# Patient Record
Sex: FEMALE | Race: WHITE
[De-identification: names, ages, dates, MRNs, and addresses within clinical notes are randomized per-mention and may not be internally consistent; named-entity substitution may affect disease eponyms.]

---

## 2020-07-23 ENCOUNTER — HOSPITAL ENCOUNTER (INPATIENT)
Dept: HOSPITAL 74 - JLDR | Age: 30
LOS: 3 days | Discharge: HOME | End: 2020-07-26
Attending: OBSTETRICS & GYNECOLOGY | Admitting: OBSTETRICS & GYNECOLOGY
Payer: COMMERCIAL

## 2020-07-23 VITALS — BODY MASS INDEX: 45.7 KG/M2

## 2020-07-23 DIAGNOSIS — Z3A.40: ICD-10-CM

## 2020-07-23 DIAGNOSIS — O48.0: ICD-10-CM

## 2020-07-23 LAB
ANION GAP SERPL CALC-SCNC: 10 MMOL/L (ref 8–16)
APTT BLD: 24.1 SECONDS (ref 25.2–36.5)
BASOPHILS # BLD: 0.7 % (ref 0–2)
BUN SERPL-MCNC: 8.6 MG/DL (ref 7–18)
CALCIUM SERPL-MCNC: 9.5 MG/DL (ref 8.5–10.1)
CHLORIDE SERPL-SCNC: 106 MMOL/L (ref 98–107)
CO2 SERPL-SCNC: 21 MMOL/L (ref 21–32)
CREAT SERPL-MCNC: 0.6 MG/DL (ref 0.55–1.3)
DEPRECATED RDW RBC AUTO: 14.9 % (ref 11.6–15.6)
EOSINOPHIL # BLD: 0.5 % (ref 0–4.5)
GLUCOSE SERPL-MCNC: 95 MG/DL (ref 74–106)
HCT VFR BLD CALC: 34.3 % (ref 32.4–45.2)
HGB BLD-MCNC: 11.1 GM/DL (ref 10.7–15.3)
INR BLD: 0.9 (ref 0.83–1.09)
LYMPHOCYTES # BLD: 15.6 % (ref 8–40)
MCH RBC QN AUTO: 28.5 PG (ref 25.7–33.7)
MCHC RBC AUTO-ENTMCNC: 32.3 G/DL (ref 32–36)
MCV RBC: 88.3 FL (ref 80–96)
MONOCYTES # BLD AUTO: 6.4 % (ref 3.8–10.2)
NEUTROPHILS # BLD: 76.8 % (ref 42.8–82.8)
PLATELET # BLD AUTO: 306 K/MM3 (ref 134–434)
PMV BLD: 9.7 FL (ref 7.5–11.1)
POTASSIUM SERPLBLD-SCNC: 4.4 MMOL/L (ref 3.5–5.1)
PT PNL PPP: 10.6 SEC (ref 9.7–13)
RBC # BLD AUTO: 3.89 M/MM3 (ref 3.6–5.2)
SODIUM SERPL-SCNC: 136 MMOL/L (ref 136–145)
WBC # BLD AUTO: 12.6 K/MM3 (ref 4–10)

## 2020-07-24 RX ADMIN — SIMETHICONE CHEW TAB 80 MG PRN MG: 80 TABLET ORAL at 18:32

## 2020-07-24 RX ADMIN — LABETALOL HYDROCHLORIDE SCH: 200 TABLET, FILM COATED ORAL at 10:00

## 2020-07-24 RX ADMIN — Medication SCH MLS/HR: at 19:48

## 2020-07-24 RX ADMIN — ACETAMINOPHEN PRN MG: 325 TABLET ORAL at 18:32

## 2020-07-24 RX ADMIN — SODIUM CHLORIDE, SODIUM GLUCONATE, SODIUM ACETATE, POTASSIUM CHLORIDE, AND MAGNESIUM CHLORIDE SCH MLS/HR: 526; 502; 368; 37; 30 INJECTION, SOLUTION INTRAVENOUS at 00:10

## 2020-07-24 RX ADMIN — LABETALOL HYDROCHLORIDE SCH MG: 200 TABLET, FILM COATED ORAL at 22:18

## 2020-07-24 RX ADMIN — Medication SCH MLS/HR: at 11:30

## 2020-07-24 NOTE — HP
Past Medical History





- Admission


Chief Complaint: in labor early


History Source: Patient


Limitations to Obtaining History: No Limitations





- Past Medical History


CNS: No: Alzheimer's, CVA, Dementia, Migraine, Multiple Sclerosis, Peripheral 

Neuropathy, Parkinson's, Seizure, Syncope, TIA, Vertigo, Other


Cardiovascular: No: AFIB, Aneurysm, Aortic Insufficiency, Aortic Stenosis, CAD, 

CHF, Deep Vein Thrombosis, HTN, Hyperlipdemia, MI, Mitral Insufficiency, Mitral 

Stenosis, Murmur, Pulmonary Hypertension, Other


Pulmonary: No: Asthma, Bronchitis, Cancer, COPD, O2 Dependent, Pneumonia, 

Previously Intubated, Pulmonary Embolus, Pulmonary Fibrosis, Sleep Apnea, Other


Gastrointestinal: No: Ascites, Cancer, Constipation, Crohn's Disease, 

Diverticulitis, Diverticulosis, Esophageal Varices, Gastritis, GERD, GI Bleed, 

Hemorrhoids, Hiatal Hernia, Inflamatory Bowel Disease, Irritable Bowel Disease, 

Pancreatitis, Peptic Ulcer Disease, Ulcerative Colitis, Other


Hepatobiliary: No: Cirrhosis, Cholelithiasis, Cholecystitis, Choledocholithia

sis, Hepatitis A, Hepatitis B, Hepatitis C, Other


Renal/: No: Renal Failure, Renal Inusuff, BPH, Cancer, Hematuria, 

Hemodialysis, Neurogenic Bladder, Renal Calculi, UTI, Other


Reproductive: No: Ectopic Pregnancy, Endometriosis, Fibroids, PID, Polycystic 

Ovary Syndrome, Postmenopausal, Other


...: 1


...Para: 0


...EDC by Emanuel: 20


Heme/Onc: No: Anemia, B12 Deficiency, Bleeding Disorder, Cancer, Current 

Chemotherapy, Current Radiation Therapy, Hemochromatosis, Hypercoaguable State, 

Myeloproliferative Synd, Sickle Cell Disease, Sickle Cell Trait, 

Thrombocytopenia, Other


Infectious Disease: No: AIDS, C-Diff, Herpes Zoster, HIV, MRSA, STD's, 

Tuberculosis, VREF, Other


Psych: No: Addictions, Anxiety, Bipolar, Depression, Panic, Psychosis, 

Schizophrenia, Other


Musculoskeletal: No: Bursitis, Chronic low back pain, Hemiparesis, Hemiplegia, 

Osteoarthritis, Paraplegia, Other


Rheumatology: No: Fibromyalgia, Gout, Lupus, Rheumatoid Arthritis, Sarcoidosis, 

Vasculitis, Other


ENT: No: Allergic Rhinitis, Sinusitis, Other


Endocrine: No: Ciales's Disease, Cushing's Disease, Diabetes Insipidus, 

Diabetes Mellitus, Hyperparathyroidism, Hyperthyroidism, Hypothyroidism, 

Osteopenia, SIADH, Other


Dermatology: No: Basal Cell, Cellulitis, Eczema, Melanoma, Psoriasis, Squamous 

Cell, Other





- Past Surgical History


Past Surgical History: No: None, AAA Repair, AICD, Amputation, Appendectomy, 

Arthrosocopy, AV Fistula/Graft, Bariatric Surgery, Breast Biopsy, Bypass, CABG, 

Carotid Endarterectomy, Cataract Removal, Cholecystectomy, Colectomy, 

Colonoscopy, Colostomy, Craniotomy, , Cystectomy, Hernia Repair, 

Hysterectomy, Ileal Conduit, Ileosotomy, Joint Replacement, Kidney Transplant, 

Laminectomy, Liver Transplant, Mastectomy, Nephrectomy, Oopherectomy, 

Orchiectomy, Permanent Pacemaker, Prostatectomy, Splenectomy, Stent, 

Thoracotomy, TURP, Tonsillectomy, Tubal Ligation, Upper Endoscopy, Valve 

Replacement, Vasectomy, Vein Stripping/Ligation


Hx Myomectomy: No


Hx Transabdominal Cerclage: No





- Advance Directives


Advance Directives: Yes: Living Will





- Smoking History


Smoking history: Never smoked


Have you smoked in the past 12 months: No





- Alcohol/Substance Use


Hx Alcohol Use: No


History of Substance Use: reports: None





- Social History


Usual Living Arrangement: Yes: With Significant Other


Do you think of yourself as: Straight/Heterosexual


ADL: Independent


History of Recent Travel: No





Home Medications





- Allergies


Allergies/Adverse Reactions: 


                                    Allergies











Allergy/AdvReac Type Severity Reaction Status Date / Time


 


No Known Allergies Allergy   Verified 20 00:00














- Home Medications


Home Medications: 


Ambulatory Orders





Labetalol HCl [Normodyne -] 200 mg PO DAILY 20 


Pnv No.95/Ferrous Fum/Folic AC [Prenatal Vitamin Tablet] 1 tab PO DAILY 20













Family Medical History


Family History: Denies





Review of Systems





- Review of Systems


Constitutional: reports: No Symptoms


Eyes: reports: No Symptoms


HENT: reports: No Symptoms


Neck: reports: No Symptoms


Cardiovascular: reports: No Symptoms


Respiratory: reports: No Symptoms


Gastrointestinal: reports: No Symptoms


Genitourinary: reports: No Symptoms


Breasts: reports: No Symptoms Reported


Musculoskeletal: reports: No Symptoms


Integumentary: reports: No Symptoms


Neurological: reports: No Symptoms


Endocrine: reports: No Symptoms


Hematology/Lymphatic: reports: No Symptoms


Psychiatric: reports: No Symptoms





Physical Exam - Maternity


Vital Signs: 


                                   Vital Signs











Temperature  99.3 F   20 06:00


 


Pulse Rate  97 H  20 06:00


 


Respiratory Rate  18   07/24/20 06:00


 


Blood Pressure  137/79   20 06:00


 


O2 Sat by Pulse Oximetry (%)      











Constitutional: Yes: Well Nourished, No Distress, Calm


Eyes: Yes: WNL, Conjunctiva Clear, EOM Intact


HENT: Yes: WNL, Atraumatic, Normocephalic


Neck: Yes: WNL, Supple, Trachea Midline


Cardiovascular: Yes: WNL, Regular Rate and Rhythm


Breast(s): Yes: WNL





- Abdominal Exam/OB


Number of Fetuses: Single


Fetal Presentation: Vertex


Contractions: Yes


Regularity: Regular


Intensity: Mod/Strong


Fetal Monitor Mode: External


Fetal Heart Rate (range): 150


Category: I


Accelerations: Uniform


Decelerations: None





- Vaginal Exam/OB


Vaginal Bleeding: No


Speculum Exam: No


Dilatation (cm): 2


Effacement (%): 80


Amniotic Membrane Status: Intact


Fetal Presentation: Vertex/Position


Fetal Station: -3





- Physical Exam


Musculoskeletal: Yes: WNL


Extremities: Yes: WNL


Edema: Yes


Edema: LUE: 2+, RUE: 2+, LLE: 2+, RLE: 2+


Integumentary: Yes: WNL


Deep Tendon Reflex Grade: Normal +2


...Motor Strength: WNL


Psychiatric: Yes: WNL, Alert, Oriented





- Labs


Lab Results: 


                                    CBC, BMP





                                 20 23:15 





                                 20 23:15 











Hemorrhage Risk Assessment





- Risk Factors


Medium Risk Factors: Yes: None


High Risk Factors: Yes: None


Risk Score: 1


Risk Level: Medium Risk





Assessment/Plan





for laboring, pt had cat 1 and cat 2 nst, on and off, cervix is unfavarable, 

labile bp on labetolo, for c s

## 2020-07-24 NOTE — OP
Operative Note





- Note:


Operative Date: 07/24/20


Pre-Operative Diagnosis: non reasssuring fht, f t progress


Operation: primary lt c s


Post-Operative Diagnosis: Same as Pre-op


Surgeon: Ender Carrion


Assistant: David Arnett


Anesthesia: Spinal


Estimated Blood Loss (mls): 500 (no complications )


Operative Report Dictated: Yes

## 2020-07-25 LAB
BASOPHILS # BLD: 0.3 % (ref 0–2)
DEPRECATED RDW RBC AUTO: 15.1 % (ref 11.6–15.6)
EOSINOPHIL # BLD: 0.1 % (ref 0–4.5)
HCT VFR BLD CALC: 30.9 % (ref 32.4–45.2)
HGB BLD-MCNC: 9.8 GM/DL (ref 10.7–15.3)
LYMPHOCYTES # BLD: 16 % (ref 8–40)
MCH RBC QN AUTO: 28.1 PG (ref 25.7–33.7)
MCHC RBC AUTO-ENTMCNC: 31.6 G/DL (ref 32–36)
MCV RBC: 88.8 FL (ref 80–96)
MONOCYTES # BLD AUTO: 8.5 % (ref 3.8–10.2)
NEUTROPHILS # BLD: 75.1 % (ref 42.8–82.8)
PLATELET # BLD AUTO: 305 K/MM3 (ref 134–434)
PMV BLD: 9 FL (ref 7.5–11.1)
RBC # BLD AUTO: 3.48 M/MM3 (ref 3.6–5.2)
WBC # BLD AUTO: 13.4 K/MM3 (ref 4–10)

## 2020-07-25 RX ADMIN — SIMETHICONE CHEW TAB 80 MG PRN MG: 80 TABLET ORAL at 01:46

## 2020-07-25 RX ADMIN — ACETAMINOPHEN PRN MG: 325 TABLET ORAL at 10:10

## 2020-07-25 RX ADMIN — ACETAMINOPHEN PRN MG: 325 TABLET ORAL at 01:46

## 2020-07-25 RX ADMIN — IBUPROFEN PRN MG: 600 TABLET, FILM COATED ORAL at 10:11

## 2020-07-25 RX ADMIN — ACETAMINOPHEN PRN MG: 325 TABLET ORAL at 17:56

## 2020-07-25 RX ADMIN — Medication SCH TAB: at 10:11

## 2020-07-25 RX ADMIN — ACETAMINOPHEN PRN MG: 325 TABLET ORAL at 06:37

## 2020-07-25 RX ADMIN — ENOXAPARIN SODIUM SCH MG: 40 INJECTION SUBCUTANEOUS at 10:11

## 2020-07-25 RX ADMIN — IBUPROFEN PRN MG: 600 TABLET, FILM COATED ORAL at 17:56

## 2020-07-25 RX ADMIN — LABETALOL HYDROCHLORIDE SCH MG: 200 TABLET, FILM COATED ORAL at 10:11

## 2020-07-25 RX ADMIN — SIMETHICONE CHEW TAB 80 MG PRN MG: 80 TABLET ORAL at 06:37

## 2020-07-25 RX ADMIN — SODIUM CHLORIDE, SODIUM GLUCONATE, SODIUM ACETATE, POTASSIUM CHLORIDE, AND MAGNESIUM CHLORIDE SCH: 526; 502; 368; 37; 30 INJECTION, SOLUTION INTRAVENOUS at 01:45

## 2020-07-25 RX ADMIN — LABETALOL HYDROCHLORIDE SCH MG: 200 TABLET, FILM COATED ORAL at 22:28

## 2020-07-25 NOTE — PN
Post Partum Progress Note


Post Partum Day: 1


Type of Delivery: Primary C/S


Vital Signs: 


                                   Vital Signs











Temperature  98.4 F   07/25/20 09:00


 


Pulse Rate  109 H  07/25/20 18:00


 


Respiratory Rate  18   07/25/20 18:00


 


Blood Pressure  126/67   07/25/20 18:00


 


O2 Sat by Pulse Oximetry (%)  97   07/24/20 22:00











Breast Exam: Yes: Soft


Uterus: Yes: Fundus Firm


Incision: Yes: Dressing dry and intact, Sutures intact


Abdomen/GI: Yes: Abdomen soft, Passing flatus, Tolerating PO


Lochia: Yes: Serosa


Lochia, amount: Small


Extremities: Yes: Calves non-tender


Activity: Ambulating





- Labs


Labs: 


                                       CBC











WBC  13.4 K/mm3 (4.0-10.0)  H  07/25/20  07:15    


 


RBC  3.48 M/mm3 (3.60-5.2)  L  07/25/20  07:15    


 


Hgb  9.8 GM/dL (10.7-15.3)  L  07/25/20  07:15    


 


Hct  30.9 % (32.4-45.2)  L  07/25/20  07:15    


 


MCV  88.8 fl (80-96)   07/25/20  07:15    


 


MCH  28.1 pg (25.7-33.7)   07/25/20  07:15    


 


MCHC  31.6 g/dl (32.0-36.0)  L  07/25/20  07:15    


 


RDW  15.1 % (11.6-15.6)   07/25/20  07:15    


 


Plt Count  305 K/MM3 (134-434)   07/25/20  07:15    


 


MPV  9.0 fl (7.5-11.1)   07/25/20  07:15    


 


Absolute Neuts (auto)  10.1 K/mm3 (1.5-8.0)  H  07/25/20  07:15    


 


Neutrophils %  75.1 % (42.8-82.8)   07/25/20  07:15    


 


Lymphocytes %  16.0 % (8-40)   07/25/20  07:15    


 


Monocytes %  8.5 % (3.8-10.2)   07/25/20  07:15    


 


Eosinophils %  0.1 % (0-4.5)   07/25/20  07:15    


 


Basophils %  0.3 % (0-2.0)   07/25/20  07:15    


 


Nucleated RBC %  0 % (0-0)   07/25/20  07:15    














Assessment/Plan





dc ivf, oob , regular diet

## 2020-07-26 VITALS — HEART RATE: 97 BPM | SYSTOLIC BLOOD PRESSURE: 120 MMHG | TEMPERATURE: 97.6 F | DIASTOLIC BLOOD PRESSURE: 67 MMHG

## 2020-07-26 RX ADMIN — ENOXAPARIN SODIUM SCH MG: 40 INJECTION SUBCUTANEOUS at 10:07

## 2020-07-26 RX ADMIN — LABETALOL HYDROCHLORIDE SCH MG: 200 TABLET, FILM COATED ORAL at 10:07

## 2020-07-26 RX ADMIN — IBUPROFEN PRN MG: 600 TABLET, FILM COATED ORAL at 03:26

## 2020-07-26 RX ADMIN — ACETAMINOPHEN PRN MG: 325 TABLET ORAL at 03:27

## 2020-07-26 RX ADMIN — SIMETHICONE CHEW TAB 80 MG PRN MG: 80 TABLET ORAL at 03:27

## 2020-07-26 RX ADMIN — Medication SCH TAB: at 10:07

## 2020-07-26 NOTE — DS
Physical Exam-GYN


Vital Signs: 


                                   Vital Signs











Temperature  97.6 F   20 09:00


 


Pulse Rate  97 H  20 09:00


 


Respiratory Rate  20   20 09:00


 


Blood Pressure  120/67   20 09:00


 


O2 Sat by Pulse Oximetry (%)  97   20 22:00











Constitutional: Yes: Well Nourished, No Distress, Calm


Eyes: Yes: WNL, Conjunctiva Clear, EOM Intact


HENT: Yes: WNL, Atraumatic, Normocephalic


Neck: Yes: WNL, Supple, Trachea Midline


Cardiovascular: Yes: WNL, Regular Rate and Rhythm


Respiratory: Yes: WNL, Regular, CTA Bilaterally


Gastrointestinal: Yes: WNL, Normal Bowel Sounds, Soft


...Rectal Exam: Yes: WNL


Renal/: Yes: WNL


Pelvis: Yes: WNL


External Genitalia: Yes: Normal


Internal Exam Deferred: No


Vaginal Exam: Yes: Normal


Cervix: Yes: Normal


Uterus: Yes: Normal


....Post Partum: Yes: Uterus firm, Uterus non-tender


Breast(s): Yes: WNL


Musculoskeletal: Yes: WNL


Extremities: Yes: WNL


Edema: Yes


Integumentary: Yes: WNL


Wound/Incision: Yes: Clean/Dry, Well Approximated


Neurological: Yes: WNL, Alert, Oriented


...Motor Strength: WNL


Psychiatric: Yes: WNL, Alert, Oriented


Labs: 


                                    CBC, BMP





                                 20 07:15 





                                 20 23:15 











Delivery





- Delivery


Type of Anesthesia: Spinal


Episiotomy/Laceration: None


EBL (cc): 800





Delivery, Single Birth





- Stages of Labor


Date 1st Stage Initiatied: 20


Time 1st Stage Initiated: 17:00


Date of Delivery: 20


Time of Delivery: 08:50


Time Placenta Delivered: 08:51





- Condition of Infant


Pediatrician/Neonatologist Present: Yes


Name: Miriam Ceja


Infant Gender: Female


Birth Weight: 3.572 kg


Position: Left, OT


Total Hours ROM (Hrs/Mins): 2min





- Apgar


  ** 1 Minute


Apgar Total Score: 9





  ** 5 Minutes


Apgar Total Score: 9





-  Feeding Plan


Initial Plan: Elected not to breastfeed exclusively throughout hospitalization





Discharge Summary


Problems reviewed: Yes


Reason For Visit: LABOR ADMIT


Procedures: Principal: primary lt c s


Other Procedures: none


Hospital Course: 


uneventful 


Health Concerns: 


none 


Plan of Treatment: 


oob as much as possible 


Condition: Good





- Instructions


Diet, Activity, Other Instructions: 





Physical activity





Resume your normal everyday activity as tolerated no heavy lifting or exercise 

until seen by your surgeon. You may walk unlimited sherly of and climb stairs. 

You may resume driving the car when you feel safe and comfortable behind the 

wheel. No sexual activity as instructed.





Wound care


If you have a bandage, leave it on, and keep dry for 48-72 hours. After that 

time discard the outer bandage. If they are tapes on the skin under the out of 

bandage leave them in place. They will peel off in the next 7 to 10 days. Do Not

Peel them off. You may shower the day after surgery. If there are tapes present 

on the skin, you may shower over them.





Diet


There are no dietary restrictions. Eat healthy, high-fiber foods. Drink 6 to 8 

glasses of liquid each day. This will assist in keeping your bowels are regular.





Pain management


You may take Tylenol or acetaminophen or Ibuprofen (for example, Motrin, Advil 

etc.) from my pain prescription medication is ordered should be taken as 

prescribed for moderate to severe pain.





Call MD for any of the following: call dr painting for 2 weeks appointment 





Severe pain not relieved by medication


Fever of 101 or higher


Excessive bleeding or drainage on dressing


Inability to urinate

















Referrals: 


Ender Painting MD [Staff Physician] - 


Disposition: HOME





- Home Medications


Comprehensive Discharge Medication List: 


Ambulatory Orders





Labetalol HCl [Normodyne -] 200 mg PO DAILY 20 


Pnv No.95/Ferrous Fum/Folic AC [Prenatal Vitamin Tablet] 1 tab PO DAILY 20










Prescription Drug Monitoring Program (I-STOP) results: I-STOP reviewed and no 

issues identified

## 2020-07-27 NOTE — OP
DATE OF OPERATION:  2020

 

PREOPERATIVE DIAGNOSIS:  Nonreassuring fetal heart rate tracing and failure to

progress.

 

POSTOPERATIVE DIAGNOSIS:  Nonreassuring fetal heart rate tracing and failure to

progress.

 

PROCEDURE:  Primary low-transverse  section.

 

SURGEON:  Ender Carrion MD

 

ASSISTANT:  XU Xavier

 

ANESTHESIA:  Spinal.

 

ANESTHESIOLOGIST:  Jody Edge MD

 

BLOOD LOSS:  About 500 mL.

 

INDICATION:  A 29-year-old female patient 40 weeks' and 1 day pregnant supposed to be

induced on  and they came in the night before complaining of mikala every

2 to 3 minutes.  Patient is also known to be chronic high blood pressure on

labetalol, and the patient also is a border gestational diabetic on diet control. 

Patient's BMI is high, 45.7.  So, patient was evaluated.  Cervix 1 to 2 cm and has

never passed that stage and the patient mikala every 2 to 3 minutes.  When

patient came in, patient was category 2 tracing, and this reversed to be category 1

tracing.  So, throughout the night, we IV hydrated her, and we gave our patient a

dose of Stadol and patient relaxed and the patient's tracing was reversed to a

category 1 and occasionally reversed to a category 2 tracing.  So, in the morning,

after 6 to 7 hours of evaluation, patient still has occasional category 2 tracing and

a blood pressure in the range of 150/100 range.  So, decision was made with the

cervix with no progression, with mikala 4 to 7 hours every 2 to 3 minutes, with

above finding of a category 2 tracing, patient was taken to the OR for primary low

transverse  section.

 

DESCRIPTION OF PROCEDURE:  Patient was placed on the operating table in supine

position after the spinal anesthesia was obtained.  The patient's abdomen and pelvis

were prepped and draped in the usual sterile manner.  A Pfannenstiel incision was

made through skin and subcutaneous tissue, and then fascia was nicked in the midline,

then fascia extended bilaterally.  Intraperitoneal cavity was entered.  Bladder flap

was not created.  The low transverse segment of the uterus was entered.  Baby

delivered from LOP position.  Baby handed over pediatrician after umbilical cord

doubly clamped and cut.  No cord blood gas was obtained.  Placenta was removed. 

Uterus was closed in single layer first with interlocking Vicryl sutures.  Good

hemostasis.  Both gutters were cleaned.  Both ovaries, fallopian tubes, and ureters

were within normal limits.  No complications.  Tolerated the procedure well. 

Draining clear urine.  Blood loss was about 500 mL.  Peritoneum was closed.  Fascia

was closed.  Skin was closed.  Transferred to recovery room in stable condition.

 

 

MD MARVEL REEDER/[id number]

DD: 2020 21:20

DT: 2020 09:31

Job #:  [id number]

## 2020-07-29 NOTE — PATH
Surgical Pathology Report



Patient Name:  BONITA AG

Accession #:  [id number]

[facility]. Rec. #:  [id number]                                                        

   /Age/Gender:  1990 (Age: 30) / F

Account:  [id number]                                                          

             Location: [facility] OBS/GYN

Taken:  2020

Received:  2020

Reported:  2020

Physicians:  Ender Carrion MD

  



Specimen(s) Received

 PLACENTA 





Clinical History

 40.2 weeks non-reassuring FHR. Hx of chronic hypertension, GDM,

diet-controlled







Final Diagnosis

PLACENTA:

THIRD TRIMESTER PLACENTA WITH SUBCHORIONIC FIBRIN DEPOSITION.

TRIVASCULAR CORD.

MEMBRANES WITH NO DIAGNOSTIC ABNORMALITIES.





***Electronically Signed***

Keegan Posadas M.D.





Gross Description

The specimen is received fresh labeled placenta and is a 678 gram, 21 x20 x

1.8cm. placenta with attached membranes and umbilical cord.  The attached

membranes are glistening, translucent, and insert marginally.  The umbilical

cord measures 43.5 cm. in length and averages 1.5 cm. in diameter.  The cord

inserts centrally, 6.5 centimeter to the nearest margin. No true knots or

strictures are identified. Cut surface of the umbilical cord reveals 3 vessels.

Sectioning reveals red-brown, spongy parenchyma.  No lesions are identified. 

Representative sections are submitted in three cassettes as follows: 1- membrane

rolls and umbilical cord; 2-3- full thickness sections of placenta
